# Patient Record
Sex: MALE | Race: WHITE | URBAN - NONMETROPOLITAN AREA
[De-identification: names, ages, dates, MRNs, and addresses within clinical notes are randomized per-mention and may not be internally consistent; named-entity substitution may affect disease eponyms.]

---

## 2021-09-09 ENCOUNTER — OFFICE VISIT (OUTPATIENT)
Dept: URBAN - NONMETROPOLITAN AREA CLINIC 1 | Facility: CLINIC | Age: 81
End: 2021-09-09
Payer: MEDICARE

## 2021-09-09 DIAGNOSIS — H35.40 PERIPHERAL RETINAL DEGENERATION: Primary | ICD-10-CM

## 2021-09-09 DIAGNOSIS — H25.13 AGE-RELATED NUCLEAR CATARACT, BILATERAL: ICD-10-CM

## 2021-09-09 PROCEDURE — 92250 FUNDUS PHOTOGRAPHY W/I&R: CPT | Performed by: OPTOMETRIST

## 2021-09-09 PROCEDURE — 99204 OFFICE O/P NEW MOD 45 MIN: CPT | Performed by: OPTOMETRIST

## 2021-09-09 PROCEDURE — 99214 OFFICE O/P EST MOD 30 MIN: CPT | Performed by: OPTOMETRIST

## 2021-09-09 PROCEDURE — 92082 INTERMEDIATE VISUAL FIELD XM: CPT | Performed by: OPTOMETRIST

## 2021-09-09 ASSESSMENT — INTRAOCULAR PRESSURE
OD: 17
OS: 16

## 2021-09-09 ASSESSMENT — KERATOMETRY
OS: 42.38
OD: 42.75

## 2021-09-09 NOTE — IMPRESSION/PLAN
Impression: Age-related nuclear cataract, bilateral: H25.13. Plan: Cataracts account for the patient's complaints. No treatment currently recommended. The patient will monitor vision changes and contact us with any decrease in vision. No Rx dispensed today.

## 2022-10-20 ENCOUNTER — OFFICE VISIT (OUTPATIENT)
Dept: URBAN - NONMETROPOLITAN AREA CLINIC 1 | Facility: CLINIC | Age: 82
End: 2022-10-20
Payer: MEDICARE

## 2022-10-20 DIAGNOSIS — H43.811 VITREOUS DEGENERATION, RIGHT EYE: ICD-10-CM

## 2022-10-20 DIAGNOSIS — H35.40 PERIPHERAL RETINAL DEGENERATION: Primary | ICD-10-CM

## 2022-10-20 DIAGNOSIS — H25.13 AGE-RELATED NUCLEAR CATARACT, BILATERAL: ICD-10-CM

## 2022-10-20 PROCEDURE — 99214 OFFICE O/P EST MOD 30 MIN: CPT | Performed by: OPTOMETRIST

## 2022-10-20 PROCEDURE — 92250 FUNDUS PHOTOGRAPHY W/I&R: CPT | Performed by: OPTOMETRIST

## 2022-10-20 ASSESSMENT — INTRAOCULAR PRESSURE
OD: 13
OS: 15

## 2022-10-20 NOTE — IMPRESSION/PLAN
Impression: Peripheral retinal degeneration: H35.40. Plan: Discussed exam findings in detail with patient. Continue to observe, yearly.

## 2022-10-20 NOTE — IMPRESSION/PLAN
Impression: Age-related nuclear cataract, bilateral: H25.13. Plan: Cataracts account for the patient's complaints. No treatment currently recommended. The patient will monitor vision changes and contact us with any decrease in vision. No glasses RX given today.

## 2022-10-20 NOTE — IMPRESSION/PLAN
Impression: Vitreous degeneration, right eye: H43.811. Plan: All signs and risks of retinal detachment were discussed in detail. Patient instructed to call the office immediately if any signs or symptoms of retinal detachment are noticed.

## 2023-10-27 ENCOUNTER — OFFICE VISIT (OUTPATIENT)
Dept: URBAN - NONMETROPOLITAN AREA CLINIC 1 | Facility: CLINIC | Age: 83
End: 2023-10-27
Payer: MEDICARE

## 2023-10-27 DIAGNOSIS — H35.40 PERIPHERAL RETINAL DEGENERATION: ICD-10-CM

## 2023-10-27 DIAGNOSIS — H40.013 OPEN ANGLE WITH BORDERLINE FINDINGS, LOW RISK, BILATERAL: Primary | ICD-10-CM

## 2023-10-27 DIAGNOSIS — H43.813 VITREOUS DEGENERATION, BILATERAL: ICD-10-CM

## 2023-10-27 DIAGNOSIS — H25.13 AGE-RELATED NUCLEAR CATARACT, BILATERAL: ICD-10-CM

## 2023-10-27 PROCEDURE — 99214 OFFICE O/P EST MOD 30 MIN: CPT | Performed by: OPTOMETRIST

## 2023-10-27 PROCEDURE — 92250 FUNDUS PHOTOGRAPHY W/I&R: CPT | Performed by: OPTOMETRIST

## 2023-10-27 ASSESSMENT — INTRAOCULAR PRESSURE
OD: 14
OS: 12

## 2023-10-27 ASSESSMENT — KERATOMETRY
OD: 42.50
OS: 42.13

## 2024-11-19 ENCOUNTER — OFFICE VISIT (OUTPATIENT)
Dept: URBAN - NONMETROPOLITAN AREA CLINIC 1 | Facility: CLINIC | Age: 84
End: 2024-11-19
Payer: MEDICARE

## 2024-11-19 DIAGNOSIS — H25.13 AGE-RELATED NUCLEAR CATARACT, BILATERAL: ICD-10-CM

## 2024-11-19 DIAGNOSIS — H35.40 PERIPHERAL RETINAL DEGENERATION: ICD-10-CM

## 2024-11-19 DIAGNOSIS — H43.813 VITREOUS DEGENERATION, BILATERAL: ICD-10-CM

## 2024-11-19 DIAGNOSIS — H40.013 OPEN ANGLE WITH BORDERLINE FINDINGS, LOW RISK, BILATERAL: Primary | ICD-10-CM

## 2024-11-19 PROCEDURE — 92250 FUNDUS PHOTOGRAPHY W/I&R: CPT | Performed by: OPTOMETRIST

## 2024-11-19 PROCEDURE — 99214 OFFICE O/P EST MOD 30 MIN: CPT | Performed by: OPTOMETRIST

## 2024-11-19 ASSESSMENT — INTRAOCULAR PRESSURE
OD: 18
OS: 20

## 2024-11-19 ASSESSMENT — KERATOMETRY
OS: 42.00
OD: 42.25